# Patient Record
Sex: FEMALE | Race: WHITE | NOT HISPANIC OR LATINO | Employment: FULL TIME | URBAN - METROPOLITAN AREA
[De-identification: names, ages, dates, MRNs, and addresses within clinical notes are randomized per-mention and may not be internally consistent; named-entity substitution may affect disease eponyms.]

---

## 2024-05-29 ENCOUNTER — ANNUAL EXAM (OUTPATIENT)
Dept: OBGYN CLINIC | Facility: CLINIC | Age: 29
End: 2024-05-29
Payer: COMMERCIAL

## 2024-05-29 VITALS
DIASTOLIC BLOOD PRESSURE: 76 MMHG | SYSTOLIC BLOOD PRESSURE: 110 MMHG | WEIGHT: 116.8 LBS | HEIGHT: 62 IN | BODY MASS INDEX: 21.49 KG/M2

## 2024-05-29 DIAGNOSIS — Z30.41 ENCOUNTER FOR SURVEILLANCE OF CONTRACEPTIVE PILLS: ICD-10-CM

## 2024-05-29 DIAGNOSIS — Z01.419 ENCNTR FOR GYN EXAM (GENERAL) (ROUTINE) W/O ABN FINDINGS: Primary | ICD-10-CM

## 2024-05-29 DIAGNOSIS — Z11.3 SCREEN FOR STD (SEXUALLY TRANSMITTED DISEASE): ICD-10-CM

## 2024-05-29 PROCEDURE — 99395 PREV VISIT EST AGE 18-39: CPT | Performed by: OBSTETRICS & GYNECOLOGY

## 2024-05-29 RX ORDER — NORGESTIMATE AND ETHINYL ESTRADIOL 0.25-0.035
1 KIT ORAL DAILY
Qty: 84 TABLET | Refills: 4 | Status: SHIPPED | OUTPATIENT
Start: 2024-05-29 | End: 2025-07-23

## 2024-05-29 NOTE — PROGRESS NOTES
Annual Wellness Visit  [facility] - [address], [address], [place] [address]    ASSESSMENT/PLAN: Hector Hill is a 28 y.o.  who presents for annual gynecologic exam.    Encounter for routine gynecologic examination  - Routine well woman exam completed today.  - Cervical Cancer Screening: Current ASCCP Guidelines reviewed. Last Pap: 05/10/2023.  Next Pap Due: 3 yrs.  - HPV Vaccination status: Immunization series complete  - STI screening offered including HIV testing: GC/CT done, others declined  - Contraceptive counseling discussed.  Current contraception: oral contraceptives (estrogen/progesterone)  - The following were reviewed in today's visit: breast self exam and use and side effects of OCPs    Additional problems addressed during this visit:  1. Encntr for gyn exam (general) (routine) w/o abn findings  2. Screen for STD (sexually transmitted disease)  -     Chlamydia/GC GARY, Confirmation  3. Encounter for surveillance of contraceptive pills  -     norgestimate-ethinyl estradiol (Sprintec 28) 0.25-35 MG-MCG per tablet; Take 1 tablet by mouth daily      Next visit: 1 yr      CC:  Annual Gynecologic Examination    HPI: Hector Hill is a 28 y.o.  who presents for annual gynecologic examination.  Patient presents for Gyn exam.  Desires to restart OCPs (Sprintec 28)        Gyn History  Patient's last menstrual period was 2024.     Last Pap: 05/10/2023 was normal    She  reports being sexually active and has had partner(s) who are male. She reports using the following method of birth control/protection: OCP.       OB History      Past Medical History:  No date: Low iron  2018:  (spontaneous vaginal delivery)      Comment:  Male     Past Surgical History:  : WISDOM TOOTH EXTRACTION; N/A     Family History   Problem Relation Age of Onset    Heart disease Maternal Grandmother     Heart disease Maternal Grandfather     Heart disease  "Paternal Grandmother     Heart disease Paternal Grandfather     Diabetes Father     Breast cancer Neg Hx     Uterine cancer Neg Hx     Ovarian cancer Neg Hx     Colon cancer Neg Hx         Social History     Tobacco Use    Smoking status: Never    Smokeless tobacco: Never   Vaping Use    Vaping status: Former    Substances: Nicotine, CBD, Flavoring   Substance Use Topics    Alcohol use: Yes     Comment: Socially    Drug use: Never          Current Outpatient Medications:     norgestimate-ethinyl estradiol (Sprintec 28) 0.25-35 MG-MCG per tablet, Take 1 tablet by mouth daily, Disp: 84 tablet, Rfl: 4    norgestimate-ethinyl estradiol (Sprintec 28) 0.25-35 MG-MCG per tablet, Take 1 tablet by mouth daily (Patient not taking: Reported on 5/29/2024), Disp: 28 tablet, Rfl: 11    She is allergic to sulfamethoxazole-trimethoprim..    ROS negative except as noted in HPI    Objective:  /76 (BP Location: Left arm, Patient Position: Sitting, Cuff Size: Standard)   Ht 5' 2\" (1.575 m)   Wt 53 kg (116 lb 12.8 oz)   LMP 05/25/2024   BMI 21.36 kg/m²      Physical Exam  Vitals and nursing note reviewed.   HENT:      Head: Normocephalic.   Chest:   Breasts:     Breasts are symmetrical.      Right: Normal. No bleeding, mass, nipple discharge, skin change or tenderness.      Left: Normal. No bleeding, mass, nipple discharge, skin change or tenderness.   Abdominal:      General: There is no distension.      Palpations: Abdomen is soft. There is no mass.      Tenderness: There is no abdominal tenderness. There is no rebound.   Genitourinary:     General: Normal vulva.      Exam position: Lithotomy position.      Labia:         Right: No rash, tenderness or lesion.         Left: No rash, tenderness or lesion.       Urethra: No urethral pain or urethral lesion.      Vagina: Normal. No vaginal discharge.      Cervix: No discharge, friability, lesion or erythema.      Uterus: Normal.       Adnexa: Right adnexa normal and left adnexa " normal.      Rectum: No external hemorrhoid.   Musculoskeletal:      Right lower leg: No edema.      Left lower leg: No edema.   Lymphadenopathy:      Upper Body:      Right upper body: No axillary or pectoral adenopathy.      Left upper body: No axillary or pectoral adenopathy.   Skin:     General: Skin is warm.   Neurological:      Mental Status: She is alert and oriented to person, place, and time.   Psychiatric:         Mood and Affect: Mood normal.         Behavior: Behavior normal.         Thought Content: Thought content normal.

## 2024-06-02 LAB
C TRACH RRNA SPEC QL NAA+PROBE: NEGATIVE
N GONORRHOEA RRNA SPEC QL NAA+PROBE: NEGATIVE

## 2024-11-13 ENCOUNTER — OFFICE VISIT (OUTPATIENT)
Dept: OBGYN CLINIC | Facility: CLINIC | Age: 29
End: 2024-11-13
Payer: COMMERCIAL

## 2024-11-13 VITALS
BODY MASS INDEX: 23.19 KG/M2 | WEIGHT: 126 LBS | SYSTOLIC BLOOD PRESSURE: 134 MMHG | HEIGHT: 62 IN | DIASTOLIC BLOOD PRESSURE: 70 MMHG

## 2024-11-13 DIAGNOSIS — N92.6 IRREGULAR MENSES: ICD-10-CM

## 2024-11-13 DIAGNOSIS — Z31.69 PRE-CONCEPTION COUNSELING: ICD-10-CM

## 2024-11-13 DIAGNOSIS — Z32.02 NEGATIVE PREGNANCY TEST: Primary | ICD-10-CM

## 2024-11-13 LAB — SL AMB POCT URINE HCG: NEGATIVE

## 2024-11-13 PROCEDURE — 81025 URINE PREGNANCY TEST: CPT | Performed by: OBSTETRICS & GYNECOLOGY

## 2024-11-13 PROCEDURE — 99214 OFFICE O/P EST MOD 30 MIN: CPT | Performed by: OBSTETRICS & GYNECOLOGY

## 2024-11-13 NOTE — PROGRESS NOTES
Assessment/Plan:      Diagnoses and all orders for this visit:    Negative pregnancy test  -     POCT urine HCG    Irregular menses       -  Patient with one cycle with more than 28 days in between.  Advised to keep menstrual calendar and inform Ob/Gyn if menses <21 days aprt or bleeding last >6 days.      -  pt declined labs stating she believes there might be male factor infertility.  Is considering LAM[facility]TE consult    Pre-conception counseling       -  advised patient to take PNV       -  Ovulation monitoring, intercourse and frequency reviewed      Subjective:     Patient ID: Hector Hill is a 29 y.o. female.    Patient presents to discuss fertility, possible male factor infertility and irregular menstrual bleeding in 9/2024.  Denies having any irregular bleeding since previous LMP.  Declines pelvic exam today        Review of Systems   Constitutional:  Negative for activity change, chills, fever and unexpected weight change.   Genitourinary:  Negative for dyspareunia, genital sores, pelvic pain, urgency, vaginal bleeding, vaginal discharge and vaginal pain.         Objective:     Physical Exam

## 2025-06-18 ENCOUNTER — ANNUAL EXAM (OUTPATIENT)
Dept: OBGYN CLINIC | Facility: CLINIC | Age: 30
End: 2025-06-18
Payer: COMMERCIAL

## 2025-06-18 VITALS
HEIGHT: 62 IN | BODY MASS INDEX: 25.76 KG/M2 | DIASTOLIC BLOOD PRESSURE: 60 MMHG | SYSTOLIC BLOOD PRESSURE: 120 MMHG | WEIGHT: 140 LBS

## 2025-06-18 DIAGNOSIS — Z01.419 ENCNTR FOR GYN EXAM (GENERAL) (ROUTINE) W/O ABN FINDINGS: Primary | ICD-10-CM

## 2025-06-18 PROCEDURE — 99395 PREV VISIT EST AGE 18-39: CPT | Performed by: OBSTETRICS & GYNECOLOGY

## 2025-06-18 NOTE — PROGRESS NOTES
"Annual Wellness Visit  [facility] - [address], [address], [place] [address]    ASSESSMENT/PLAN: Hector Hill is a 29 y.o.  who presents for annual gynecologic exam.    Encounter for routine gynecologic examination  - Routine well woman exam completed today.  - Cervical Cancer Screening: Current ASCCP Guidelines reviewed. Last Pap: 05/10/2023. Past abnormal pap: none.  Next Pap Due: .  - STI screening offered including HIV testing: offered, pt declined  - Contraceptive counseling discussed.  Current contraception: no method  - The following were reviewed in today's visit: breast self exam    Additional problems addressed during this visit:  1. Encntr for gyn exam (general) (routine) w/o abn findings      Next visit: 1 yr [place]      CC:  Annual Gynecologic Examination    HPI: Hector Hill is a 29 y.o.  who presents for annual gynecologic examination.  Pt presents for Gyn wellness.  Denies having any concerns    Gynecologic Exam        Gyn History  Patient's last menstrual period was 2025 (exact date).     Last Pap: 05/10/2023 was normal    She  reports being sexually active and has had partner(s) who are male. She reports using the following method of birth control/protection: OCP.       OB History      Past Medical History:  No date: Low iron  2018:  (spontaneous vaginal delivery)      Comment:  Male     Past Surgical History:  : WISDOM TOOTH EXTRACTION; N/A     Family History[1]     Social History[2]     Current Medications[3]    She is allergic to sulfamethoxazole-trimethoprim..    ROS negative except as noted in HPI    Objective:  /60 (BP Location: Left arm, Patient Position: Sitting, Cuff Size: Standard)   Ht 5' 2\" (1.575 m)   Wt 63.5 kg (140 lb)   LMP 2025 (Exact Date)   BMI 25.61 kg/m²      Physical Exam  Vitals and nursing note reviewed.   HENT:      Head: Normocephalic.   Chest:   Breasts:     Breasts are " symmetrical.      Right: Normal. No bleeding, mass, nipple discharge, skin change or tenderness.      Left: Normal. No bleeding, mass, nipple discharge, skin change or tenderness.   Abdominal:      General: There is no distension.      Palpations: Abdomen is soft. There is no mass.      Tenderness: There is no abdominal tenderness. There is no rebound.   Genitourinary:     General: Normal vulva.      Exam position: Lithotomy position.      Labia:         Right: No rash, tenderness or lesion.         Left: No rash, tenderness or lesion.       Urethra: No urethral pain or urethral lesion.      Vagina: Normal. No vaginal discharge.      Cervix: No discharge, friability, lesion or erythema.      Uterus: Normal.       Adnexa: Right adnexa normal and left adnexa normal.      Rectum: No external hemorrhoid.     Musculoskeletal:      Right lower leg: No edema.      Left lower leg: No edema.   Lymphadenopathy:      Upper Body:      Right upper body: No axillary or pectoral adenopathy.      Left upper body: No axillary or pectoral adenopathy.     Skin:     General: Skin is warm.     Neurological:      Mental Status: She is alert and oriented to person, place, and time.     Psychiatric:         Mood and Affect: Mood normal.         Behavior: Behavior normal.         Thought Content: Thought content normal.              [1]   Family History  Problem Relation Name Age of Onset    Heart disease Maternal Grandmother Valdez     Heart disease Maternal Grandfather Westley     Heart disease Paternal Grandmother Olena     Heart disease Paternal Grandfather Justice     Diabetes Father Nick     Breast cancer Neg Hx      Uterine cancer Neg Hx      Ovarian cancer Neg Hx      Colon cancer Neg Hx     [2]   Social History  Tobacco Use    Smoking status: Never    Smokeless tobacco: Never   Vaping Use    Vaping status: Former    Substances: Nicotine, CBD, Flavoring   Substance Use Topics    Alcohol use: Yes     Comment: Socially    Drug  use: Never   [3] No current outpatient medications on file.